# Patient Record
Sex: FEMALE | HISPANIC OR LATINO | URBAN - METROPOLITAN AREA
[De-identification: names, ages, dates, MRNs, and addresses within clinical notes are randomized per-mention and may not be internally consistent; named-entity substitution may affect disease eponyms.]

---
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
## 2022-01-12 ENCOUNTER — OFFICE VISIT (OUTPATIENT)
Dept: FAMILY MEDICINE | Facility: CLINIC | Age: 22
End: 2022-01-12
Payer: COMMERCIAL

## 2022-01-12 VITALS
OXYGEN SATURATION: 98 % | HEIGHT: 66 IN | RESPIRATION RATE: 16 BRPM | BODY MASS INDEX: 21.6 KG/M2 | TEMPERATURE: 98.6 F | SYSTOLIC BLOOD PRESSURE: 102 MMHG | HEART RATE: 73 BPM | WEIGHT: 134.4 LBS | DIASTOLIC BLOOD PRESSURE: 67 MMHG

## 2022-01-12 DIAGNOSIS — R30.0 DYSURIA: Primary | ICD-10-CM

## 2022-01-12 DIAGNOSIS — F41.9 ANXIETY: ICD-10-CM

## 2022-01-12 DIAGNOSIS — N89.8 VAGINAL IRRITATION: ICD-10-CM

## 2022-01-12 PROBLEM — Z97.5 IUD CONTRACEPTION: Status: ACTIVE | Noted: 2022-01-12

## 2022-01-12 LAB
ALBUMIN UR-MCNC: NEGATIVE MG/DL
APPEARANCE UR: CLEAR
BACTERIA #/AREA URNS HPF: ABNORMAL /HPF
BILIRUB UR QL STRIP: NEGATIVE
CLUE CELLS: NORMAL
COLOR UR AUTO: YELLOW
GLUCOSE UR STRIP-MCNC: NEGATIVE MG/DL
HGB UR QL STRIP: ABNORMAL
KETONES UR STRIP-MCNC: NEGATIVE MG/DL
LEUKOCYTE ESTERASE UR QL STRIP: ABNORMAL
NITRATE UR QL: NEGATIVE
PH UR STRIP: 6 [PH] (ref 5–8)
RBC #/AREA URNS AUTO: ABNORMAL /HPF
SP GR UR STRIP: <=1.005 (ref 1–1.03)
TRICHOMONAS, WET PREP: NORMAL
UROBILINOGEN UR STRIP-ACNC: 0.2 E.U./DL
WBC #/AREA URNS AUTO: ABNORMAL /HPF
WBC'S/HIGH POWER FIELD, WET PREP: NORMAL
YEAST, WET PREP: NORMAL

## 2022-01-12 PROCEDURE — 81001 URINALYSIS AUTO W/SCOPE: CPT | Performed by: FAMILY MEDICINE

## 2022-01-12 PROCEDURE — 87210 SMEAR WET MOUNT SALINE/INK: CPT | Performed by: FAMILY MEDICINE

## 2022-01-12 PROCEDURE — 99214 OFFICE O/P EST MOD 30 MIN: CPT | Performed by: FAMILY MEDICINE

## 2022-01-12 ASSESSMENT — ENCOUNTER SYMPTOMS
FLANK PAIN: 0
FEVER: 0
DIARRHEA: 0
BRUISES/BLEEDS EASILY: 0
DYSPHORIC MOOD: 0
CONSTIPATION: 0
ABDOMINAL DISTENTION: 0
RESPIRATORY NEGATIVE: 1
FATIGUE: 1
DIFFICULTY URINATING: 0
CHILLS: 0
ADENOPATHY: 0
HEADACHES: 0
CARDIOVASCULAR NEGATIVE: 1
VOMITING: 1
DIAPHORESIS: 0
ARTHRALGIAS: 0
NAUSEA: 0
SEIZURES: 0
NERVOUS/ANXIOUS: 1

## 2022-01-12 ASSESSMENT — MIFFLIN-ST. JEOR: SCORE: 1408.13

## 2022-01-12 NOTE — PROGRESS NOTES
Assessment & Plan     Urinary frequency  H/o UTI's  UA with trace LE and trace heme - just at end of menses. Await micro, also sent for culture.   Has Rx for Bactrim DS - ok to take bid x 3 days pending UC results. Has ParaGard and recent/current LMP. No concern for pregnancy.  - UA reflex to Microscopic and Culture  - Urine Microscopic    Vaginal irritation  - Wet prep negative  - suspect friction related. Education on use of lubricant.     Fatigue and myalgias  - in the context of very little sleep overnight. Fully vaccinated for COVID and influenza. No known recent exposures. Visit was for  symptoms, did not test for COVID.     Anxiety  - suspect symptoms exacerbated in context of some increased nerves about leaving for college. Not on med for mood at baseline. Support system in place. Reassurance offered.     Follow-up if not improving as expected    33 minutes spent on the date of the encounter doing chart review, review of test results, patient visit and documentation       Elizabeth Christy MD  [facility]  =====================================      Subjective   Alana is a 21 year old who presents for the following health issues   Chief Complaint   Patient presents with     UTI     Patient reports urinary urgency, frequency and dull vaginal pain. Maybe mild itch, no odor     HPI   - PMH sig for well controlled epilepsy and mixed mood disorder/medical anxiety. One prior UTI requiring admission at [facility] in [place] - initially thought to have been acute appendicitis, but was not. Leaving for college in [place] [place] later this week.    - Has a ParaGard for contraception and has fairly long periods. LMP 1/4/21, with spotty bleeding ending in last 24 hours. Sexually active with one male partner during this winter break; monogamous, reports that neither have ever had other partners. Not using condoms all the time. No h/o STI's.     - Current symptoms started with mild external vaginal  "irritation 3 days ago. Developed urinary frequency last evening and a lot of worry about possible recurrence of symptoms that led to hospitalization last year.   Doesn't really endorse renetta dysuria, some urgency present. Mild suprapubic discomfort today.     - Sister had COVID around [place]. Rest of family tested negative. Fully vaccinated and boosted. No other known exposures.    Review of Systems   Constitutional: Positive for fatigue. Negative for chills, diaphoresis and fever.   Respiratory: Negative.    Cardiovascular: Negative.    Gastrointestinal: Positive for vomiting. Negative for abdominal distention, constipation, diarrhea and nausea.        Got so anxious last night that she vomited   Genitourinary: Negative for difficulty urinating, dyspareunia, flank pain and genital sores.   Musculoskeletal: Negative for arthralgias.        Feels achy today   Skin: Negative.    Neurological: Negative for seizures and headaches.   Hematological: Negative for adenopathy. Does not bruise/bleed easily.   Psychiatric/Behavioral: Negative for dysphoric mood. The patient is nervous/anxious.          Objective    /67   Pulse 73   Temp 98.6  F (37  C) (Temporal)   Resp 16   Ht 1.687 m (5' 6.43\")   Wt 61 kg (134 lb 6.4 oz)   LMP 01/04/2022 (Exact Date)   SpO2 98%   BMI 21.42 kg/m       Physical Exam  Constitutional:       General: She is not in acute distress.     Appearance: Normal appearance. She is not ill-appearing.   Abdominal:      General: Abdomen is flat. Bowel sounds are normal. There is no distension.      Palpations: Abdomen is soft. There is no mass.      Tenderness: There is no abdominal tenderness. There is no right CVA tenderness, left CVA tenderness, guarding or rebound.   Genitourinary:      Neurological:      Mental Status: She is alert.   Psychiatric:         Attention and Perception: Attention normal.         Speech: Speech normal.         Behavior: Behavior is cooperative.         Thought " Content: Thought content normal.      Comments: Affect is fairly anxious. Doesn't specifically identify feeling nervous about leaving for college this weekend (first time) but suspect that she is.          Results for orders placed or performed in visit on 01/12/22 (from the past 24 hour(s))   UA reflex to Microscopic and Culture    Specimen: Urine, Midstream   Result Value Ref Range    Color Urine Yellow Colorless, Straw, Light Yellow, Yellow    Appearance Urine Clear Clear    Glucose Urine Negative Negative mg/dL    Bilirubin Urine Negative Negative    Ketones Urine Negative Negative mg/dL    Specific Gravity Urine <=1.005 1.005 - 1.030    Blood Urine Trace (A) Negative    pH Urine 6.0 5.0 - 8.0    Protein Albumin Urine Negative Negative mg/dL    Urobilinogen Urine 0.2 0.2, 1.0 E.U./dL    Nitrite Urine Negative Negative    Leukocyte Esterase Urine Trace (A) Negative   Wet prep    Specimen: Vagina; Swab   Result Value Ref Range    Trichomonas Absent Absent    Yeast Absent Absent    Clue Cells Absent Absent    WBCs/high power field None None    Narrative    Ph>=4.5, no odor

## 2022-07-25 PROBLEM — G40.B09 NONINTRACTABLE JUVENILE MYOCLONIC EPILEPSY WITHOUT STATUS EPILEPTICUS (H): Status: ACTIVE | Noted: 2022-07-25

## 2022-07-26 ENCOUNTER — ALLIED HEALTH/NURSE VISIT (OUTPATIENT)
Dept: FAMILY MEDICINE | Facility: CLINIC | Age: 22
End: 2022-07-26
Payer: COMMERCIAL

## 2022-07-26 DIAGNOSIS — Z23 ENCOUNTER FOR IMMUNIZATION: Primary | ICD-10-CM

## 2022-07-26 PROCEDURE — 99207 PR NO CHARGE NURSE ONLY: CPT

## 2022-07-26 PROCEDURE — 90471 IMMUNIZATION ADMIN: CPT

## 2022-07-26 PROCEDURE — 90675 RABIES VACCINE IM: CPT

## 2022-07-26 NOTE — PROGRESS NOTES
Clinic Administered Medication Documentation          Injectable Medication Documentation    Patient was given Rabies Vaccine . Prior to medication administration, verified patients identity using patient s name and date of birth. Please see MAR and medication order for additional information. Patient instructed to remain in clinic for 15 minutes and report any adverse reaction to staff immediately .      Was entire vial of medication used? Yes  Vial/Syringe: Single dose vial  Expiration Date:  12/16/2023  Was this medication supplied by the patient? No    Name of provider who requested the medication administration: n/a  Name of provider on site (faculty or community preceptor) at the time of performing the medication administration: [employer]     Date of next administration: 8/1/22  Date of next office visit with provider to renew medication plan (must be seen annually): KEISHA

## 2022-08-01 ENCOUNTER — ALLIED HEALTH/NURSE VISIT (OUTPATIENT)
Dept: FAMILY MEDICINE | Facility: CLINIC | Age: 22
End: 2022-08-01
Payer: COMMERCIAL

## 2022-08-01 DIAGNOSIS — Z23 ENCOUNTER FOR IMMUNIZATION: Primary | ICD-10-CM

## 2022-08-01 PROCEDURE — 90675 RABIES VACCINE IM: CPT

## 2022-08-01 PROCEDURE — 90471 IMMUNIZATION ADMIN: CPT

## 2022-08-01 PROCEDURE — 99207 PR NO CHARGE NURSE ONLY: CPT

## 2022-08-08 ENCOUNTER — ALLIED HEALTH/NURSE VISIT (OUTPATIENT)
Dept: FAMILY MEDICINE | Facility: CLINIC | Age: 22
End: 2022-08-08
Payer: COMMERCIAL

## 2022-08-08 DIAGNOSIS — Z23 ENCOUNTER FOR IMMUNIZATION: Primary | ICD-10-CM

## 2022-08-08 PROCEDURE — 90471 IMMUNIZATION ADMIN: CPT

## 2022-08-08 PROCEDURE — 90675 RABIES VACCINE IM: CPT

## 2022-08-08 PROCEDURE — 99207 PR NO CHARGE NURSE ONLY: CPT

## 2022-11-21 DIAGNOSIS — Z79.899 MEDICATION MANAGEMENT: ICD-10-CM

## 2022-11-21 DIAGNOSIS — G40.B09 NONINTRACTABLE JUVENILE MYOCLONIC EPILEPSY WITHOUT STATUS EPILEPTICUS (H): ICD-10-CM

## 2022-11-21 DIAGNOSIS — N91.2 AMENORRHEA: Primary | ICD-10-CM

## 2023-01-06 ENCOUNTER — LAB (OUTPATIENT)
Dept: LAB | Facility: CLINIC | Age: 23
End: 2023-01-06
Payer: COMMERCIAL

## 2023-01-06 DIAGNOSIS — N91.2 AMENORRHEA: ICD-10-CM

## 2023-01-06 DIAGNOSIS — G40.B09 NONINTRACTABLE JUVENILE MYOCLONIC EPILEPSY WITHOUT STATUS EPILEPTICUS (H): ICD-10-CM

## 2023-01-06 DIAGNOSIS — Z79.899 MEDICATION MANAGEMENT: ICD-10-CM

## 2023-01-06 LAB — FSH SERPL IRP2-ACNC: 0.5 MIU/ML

## 2023-01-06 PROCEDURE — 99000 SPECIMEN HANDLING OFFICE-LAB: CPT

## 2023-01-06 PROCEDURE — 83001 ASSAY OF GONADOTROPIN (FSH): CPT

## 2023-01-06 PROCEDURE — 80175 DRUG SCREEN QUAN LAMOTRIGINE: CPT | Mod: 90

## 2023-01-06 PROCEDURE — 36415 COLL VENOUS BLD VENIPUNCTURE: CPT

## 2023-01-06 PROCEDURE — 82670 ASSAY OF TOTAL ESTRADIOL: CPT

## 2023-01-08 LAB — LAMOTRIGINE SERPL-MCNC: 11.6 UG/ML

## 2023-01-10 LAB — ESTRADIOL SERPL HS-MCNC: 104 PG/ML

## 2023-06-05 ENCOUNTER — MEDICAL CORRESPONDENCE (OUTPATIENT)
Dept: HEALTH INFORMATION MANAGEMENT | Facility: CLINIC | Age: 23
End: 2023-06-05
Payer: COMMERCIAL

## 2023-06-08 ENCOUNTER — DOCUMENTATION ONLY (OUTPATIENT)
Dept: FAMILY MEDICINE | Facility: CLINIC | Age: 23
End: 2023-06-08

## 2023-06-08 ENCOUNTER — LAB (OUTPATIENT)
Dept: LAB | Facility: CLINIC | Age: 23
End: 2023-06-08
Payer: COMMERCIAL

## 2023-06-08 DIAGNOSIS — R42 LIGHTHEADEDNESS: ICD-10-CM

## 2023-06-08 DIAGNOSIS — R42 LIGHTHEADEDNESS: Primary | ICD-10-CM

## 2023-06-08 DIAGNOSIS — G40.001 LOCALIZATION-RELATED IDIOPATHIC EPILEPSY AND EPILEPTIC SYNDROMES WITH SEIZURES OF LOCALIZED ONSET, NOT INTRACTABLE, WITH STATUS EPILEPTICUS (H): Primary | ICD-10-CM

## 2023-06-08 DIAGNOSIS — Z97.5 IUD CONTRACEPTION: Primary | ICD-10-CM

## 2023-06-08 LAB
ALBUMIN SERPL BCG-MCNC: 4.8 G/DL (ref 3.5–5.2)
ALP SERPL-CCNC: 54 U/L (ref 35–104)
ALT SERPL W P-5'-P-CCNC: 24 U/L (ref 10–35)
ANION GAP SERPL CALCULATED.3IONS-SCNC: 12 MMOL/L (ref 7–15)
AST SERPL W P-5'-P-CCNC: 31 U/L (ref 10–35)
BILIRUB SERPL-MCNC: 0.4 MG/DL
BUN SERPL-MCNC: 9.8 MG/DL (ref 6–20)
CALCIUM SERPL-MCNC: 9.7 MG/DL (ref 8.6–10)
CHLORIDE SERPL-SCNC: 103 MMOL/L (ref 98–107)
CREAT SERPL-MCNC: 0.99 MG/DL (ref 0.51–0.95)
DEPRECATED HCO3 PLAS-SCNC: 24 MMOL/L (ref 22–29)
ERYTHROCYTE [DISTWIDTH] IN BLOOD BY AUTOMATED COUNT: 13.1 % (ref 10–15)
FERRITIN SERPL-MCNC: 12 NG/ML (ref 6–175)
GFR SERPL CREATININE-BSD FRML MDRD: 83 ML/MIN/1.73M2
GLUCOSE SERPL-MCNC: 101 MG/DL (ref 70–99)
HCT VFR BLD AUTO: 37.7 % (ref 35–47)
HGB BLD-MCNC: 11.8 G/DL (ref 11.7–15.7)
HOLD SPECIMEN: NORMAL
IRON BINDING CAPACITY (ROCHE): 293 UG/DL (ref 240–430)
IRON SATN MFR SERPL: 17 % (ref 15–46)
IRON SERPL-MCNC: 49 UG/DL (ref 37–145)
MCH RBC QN AUTO: 27.1 PG (ref 26.5–33)
MCHC RBC AUTO-ENTMCNC: 31.3 G/DL (ref 31.5–36.5)
MCV RBC AUTO: 87 FL (ref 78–100)
PLATELET # BLD AUTO: 283 10E3/UL (ref 150–450)
POTASSIUM SERPL-SCNC: 4.4 MMOL/L (ref 3.4–5.3)
PROT SERPL-MCNC: 7.3 G/DL (ref 6.4–8.3)
RBC # BLD AUTO: 4.35 10E6/UL (ref 3.8–5.2)
SODIUM SERPL-SCNC: 139 MMOL/L (ref 136–145)
TSH SERPL DL<=0.005 MIU/L-ACNC: 2.13 UIU/ML (ref 0.3–4.2)
WBC # BLD AUTO: 4.3 10E3/UL (ref 4–11)

## 2023-06-08 PROCEDURE — 80175 DRUG SCREEN QUAN LAMOTRIGINE: CPT | Mod: 90

## 2023-06-08 PROCEDURE — 85027 COMPLETE CBC AUTOMATED: CPT

## 2023-06-08 PROCEDURE — 36415 COLL VENOUS BLD VENIPUNCTURE: CPT

## 2023-06-08 PROCEDURE — 99000 SPECIMEN HANDLING OFFICE-LAB: CPT

## 2023-06-08 PROCEDURE — 84443 ASSAY THYROID STIM HORMONE: CPT

## 2023-06-08 PROCEDURE — 82728 ASSAY OF FERRITIN: CPT

## 2023-06-08 PROCEDURE — 80053 COMPREHEN METABOLIC PANEL: CPT

## 2023-06-08 PROCEDURE — 99207 PR NO CHARGE LOS: CPT | Performed by: FAMILY MEDICINE

## 2023-06-08 PROCEDURE — 83550 IRON BINDING TEST: CPT

## 2023-06-08 PROCEDURE — 83540 ASSAY OF IRON: CPT

## 2023-06-08 NOTE — PROGRESS NOTES
Patient with PMH significant for epilepsy here for lab draw/Lamotrigine level ordered by her neurologist. I was made aware that she was on site and had a few minutes to talk with her. She is reporting about a 4 weeks history of new symptom which she describes as a feeling of lightheadedness/dizziness (not vertigo) that starts when she wakes in morning and is lasting for much of the day. Says it feels like a weird sensation behind her eyes - not painful, doesn't change with position or closing eyes, and no associated vision change. Has noticed that it seems to go away when she is exercising but then starts again right after. Does not wake her from sleep. No new meds, supplements. Sleeping and eating per normal, drinks a lot of water. Exercises daily. No systemic symptoms, no other neurologic symptoms including seizures. Has Mirena IUD, LMP in the last month. Had an eye exam in the last year.    Had prolonged episode of vertigo last year while at college and ended up in ED/hospital for work up. Ultimately was thought to be a reaction to rabies vaccine (had a bat contact). Eventually resolved and has not recurred. This new symptom is not the same, but has triggered a preexisting anxiety that she feels about her health overall.     Agreed to adding labs to todays draw to try to rule out potential causes for her symptom and will be in touch with her with results. Asked her to keep detailed diary of symptom. Will have her back for exam depending on results.    Elizabeth Christy MD

## 2023-06-09 LAB — LAMOTRIGINE SERPL-MCNC: 16 UG/ML

## 2024-05-05 DIAGNOSIS — N76.0 BACTERIAL VAGINOSIS: Primary | ICD-10-CM

## 2024-05-05 DIAGNOSIS — B96.89 BACTERIAL VAGINOSIS: Primary | ICD-10-CM

## 2024-05-05 RX ORDER — METRONIDAZOLE 500 MG/1
500 TABLET ORAL 2 TIMES DAILY
Qty: 14 TABLET | Refills: 0 | Status: SHIPPED | OUTPATIENT
Start: 2024-05-05 | End: 2024-05-12

## 2024-06-25 DIAGNOSIS — Z97.5 IUD CONTRACEPTION: ICD-10-CM

## 2024-06-25 DIAGNOSIS — N92.0 MENORRHAGIA WITH REGULAR CYCLE: Primary | ICD-10-CM

## 2024-06-25 RX ORDER — COPPER 313.4 MG/1
1 INTRAUTERINE DEVICE INTRAUTERINE ONCE
Status: SHIPPED
Start: 2024-06-25 | End: 2024-06-25

## 2024-06-25 RX ORDER — LAMOTRIGINE 200 MG/1
400 TABLET ORAL DAILY
COMMUNITY
Start: 2024-06-25

## 2024-06-25 NOTE — PROGRESS NOTES
"Phone call with Jennifer Rodriguez who is a 24 yo  with a ParaGard IUD placed 21. She is reporting more frequent, heavier periods over last 6+ months. Goes to college in [place], home for short visit.     [place] like she was \"always bleeding\" so started tracking her periods 5 months ago.   24 - 2/28  3/19- 3/24  4/14 - 4/21  5/10 -  -     Has gone from 27 to 25 day cycle, previously reporting 30 d.  Is using Ultra and super plus tampons and changing every 2 hours in first days. Duration of bleeding reported as increasing from 5 to 7 days. More crampy, maybe more sad in days leading up to period. Knew periods could be harder with copper IUD but doesn't understand why that would not have been the case since  or what's changed. Does say that she typically doesn't pay much attention to this and the change may have been happening longer than she is reporting.    No new meds. Lamictal dose increased from 300 to 600 mg by her external neurologist > 1 year ago. Denies feeling more tired, light headed, SOB with exertion. Says that she is \"always banged up\" from sports but not bruising any more than usual. No bleeding from anywhere else.  Normal diet. No weight change.    Reviewed potential reasons for change in bleeding pattern with ParaGard and options for managing. Had Mirena previously but switched due to concern that it might be affecting her mood. Is open to going back to Mirena or trying Nexplanon but is returning to [place] [facility] in 2 days.   Shared decision making on plan as follows:    1) lab only appt tomorrow for CBC, iron studies, TSH.  2) start Ibu 800 mg on first day of next period and for duration.   3) keep diary of number of tampon/pad changes each day of next period.  4) think more about options. If decides she wants to try hormonal method again can do in [place] [facility] or schedule for next time she is in [place] [facility].   5) Will follow-up with her directly on lab results. Knows to contact me if bleeding worsens or other " concern in interim.     Elizabeth Christy MD

## 2024-06-26 ENCOUNTER — LAB (OUTPATIENT)
Dept: LAB | Facility: CLINIC | Age: 24
End: 2024-06-26
Payer: COMMERCIAL

## 2024-06-26 DIAGNOSIS — N92.0 MENORRHAGIA WITH REGULAR CYCLE: ICD-10-CM

## 2024-06-26 LAB
ERYTHROCYTE [DISTWIDTH] IN BLOOD BY AUTOMATED COUNT: 15.5 % (ref 10–15)
FERRITIN SERPL-MCNC: 12 NG/ML (ref 6–175)
HCT VFR BLD AUTO: 37.9 % (ref 35–47)
HGB BLD-MCNC: 11.7 G/DL (ref 11.7–15.7)
HOLD SPECIMEN: NORMAL
IRON BINDING CAPACITY (ROCHE): 308 UG/DL (ref 240–430)
IRON SATN MFR SERPL: 10 % (ref 15–46)
IRON SERPL-MCNC: 31 UG/DL (ref 37–145)
MCH RBC QN AUTO: 24.9 PG (ref 26.5–33)
MCHC RBC AUTO-ENTMCNC: 30.9 G/DL (ref 31.5–36.5)
MCV RBC AUTO: 81 FL (ref 78–100)
PLATELET # BLD AUTO: 295 10E3/UL (ref 150–450)
RBC # BLD AUTO: 4.69 10E6/UL (ref 3.8–5.2)
TSH SERPL DL<=0.005 MIU/L-ACNC: 1.84 UIU/ML (ref 0.3–4.2)
WBC # BLD AUTO: 5 10E3/UL (ref 4–11)

## 2024-06-26 PROCEDURE — 83550 IRON BINDING TEST: CPT

## 2024-06-26 PROCEDURE — 36415 COLL VENOUS BLD VENIPUNCTURE: CPT

## 2024-06-26 PROCEDURE — 85027 COMPLETE CBC AUTOMATED: CPT

## 2024-06-26 PROCEDURE — 83540 ASSAY OF IRON: CPT

## 2024-06-26 PROCEDURE — 84443 ASSAY THYROID STIM HORMONE: CPT

## 2024-06-26 PROCEDURE — 82728 ASSAY OF FERRITIN: CPT

## 2024-07-02 DIAGNOSIS — A74.9 CHLAMYDIA: Primary | ICD-10-CM

## 2024-07-02 RX ORDER — DOXYCYCLINE 100 MG/1
100 CAPSULE ORAL 2 TIMES DAILY
Qty: 14 CAPSULE | Refills: 0 | Status: SHIPPED | OUTPATIENT
Start: 2024-07-02 | End: 2024-07-10

## 2024-07-02 NOTE — PROGRESS NOTES
Received call from Jennifer Rodriguez who is attending college in [place] and has tested positive for Chlamydia. She has also forwarded her complete test results which will be scanned to [facility]. All other results negative.    One male partner who reported no other partners. Has an IUD, did not use condoms. Developed vulvar itching and atypical discharge and was seen in an UC that provided her results but apparently told her that she needed to work with her PCP on treatment. She thinks it may be due to her insurance.    No prior STI's. No antibiotic allergies. Does have epilepsy, treated with Lamictal.     A: Chlamydia infection   P:   Doxycycline 100 mg bid x 7 days. Rx sent.   Discussion of partner notification.   Discussion of future prevention.   Follow-up sourav Christy MD

## 2024-07-10 DIAGNOSIS — Z20.2 EXPOSURE TO SEXUALLY TRANSMITTED INFECTION: ICD-10-CM

## 2024-07-10 RX ORDER — AZITHROMYCIN 500 MG/1
1000 TABLET, FILM COATED ORAL DAILY
Qty: 2 TABLET | Refills: 0 | Status: SHIPPED | OUTPATIENT
Start: 2024-07-10 | End: 2024-07-11

## 2024-07-16 DIAGNOSIS — B96.89 BACTERIAL VAGINOSIS: ICD-10-CM

## 2024-07-16 DIAGNOSIS — B37.31 YEAST INFECTION OF THE VAGINA: Primary | ICD-10-CM

## 2024-07-16 DIAGNOSIS — N76.0 BACTERIAL VAGINOSIS: ICD-10-CM

## 2024-07-16 RX ORDER — METRONIDAZOLE 7.5 MG/G
1 GEL VAGINAL DAILY
Qty: 25 G | Refills: 0 | Status: SHIPPED | OUTPATIENT
Start: 2024-07-16 | End: 2024-07-21

## 2024-07-16 RX ORDER — FLUCONAZOLE 150 MG/1
TABLET ORAL
Qty: 2 TABLET | Refills: 0 | Status: SHIPPED | OUTPATIENT
Start: 2024-07-16 | End: 2024-07-23

## 2024-07-23 DIAGNOSIS — T36.95XA ANTIBIOTIC-INDUCED YEAST INFECTION: Primary | ICD-10-CM

## 2024-07-23 DIAGNOSIS — B37.9 ANTIBIOTIC-INDUCED YEAST INFECTION: Primary | ICD-10-CM

## 2024-07-23 DIAGNOSIS — B37.31 YEAST INFECTION OF THE VAGINA: ICD-10-CM

## 2024-07-23 RX ORDER — FLUCONAZOLE 150 MG/1
TABLET ORAL
Qty: 2 TABLET | Refills: 0 | Status: SHIPPED | OUTPATIENT
Start: 2024-07-23

## 2024-07-23 NOTE — PROGRESS NOTES
Jennifer Rodriguez has recently completed courses of Doxycycline and Azithomycin for treatment of Chlamydia and Ureaplasma. Post treatment she tested neg for GC but positive for BV and yeast. Then completed course of vaginal Metronidazole as well as Diflucan 150 at front and back end of antibiotic course. Unfortunately, her yeast vaginitis symptoms have not resolved. She reports large clumps of cottage cheese like discharge, vulvar itching and burning at the end of urination. No urgency, frequency, hematuria. No abd/pelvic pain. No systemic symptoms.     Antibiotic-induced yeast infection  -     fluconazole (DIFLUCAN) 150 MG tablet; Take 1 tablet today and take second tablet in 72 hours.      Elizabeth Christy MD

## 2024-08-31 ENCOUNTER — HEALTH MAINTENANCE LETTER (OUTPATIENT)
Age: 24
End: 2024-08-31